# Patient Record
Sex: FEMALE | Race: WHITE | NOT HISPANIC OR LATINO | URBAN - NONMETROPOLITAN AREA
[De-identification: names, ages, dates, MRNs, and addresses within clinical notes are randomized per-mention and may not be internally consistent; named-entity substitution may affect disease eponyms.]

---

## 2017-01-23 ENCOUNTER — IMPORTED ENCOUNTER (OUTPATIENT)
Dept: URBAN - NONMETROPOLITAN AREA CLINIC 1 | Facility: CLINIC | Age: 82
End: 2017-01-23

## 2017-01-23 PROBLEM — H52.223: Noted: 2017-01-23

## 2017-01-23 PROBLEM — H52.03: Noted: 2017-01-23

## 2017-01-23 PROBLEM — H25.813: Noted: 2017-01-23

## 2017-01-23 PROBLEM — H52.4: Noted: 2017-01-23

## 2017-01-23 PROCEDURE — 92004 COMPRE OPH EXAM NEW PT 1/>: CPT

## 2017-01-23 NOTE — PATIENT DISCUSSION
Cataract(s)-Visually significant cataract OU.-Cataract(s) causing symptomatic impairment of visual function not correctable with a tolerable change in glasses or contact lenses lighting or non-operative means resulting in specific activity limitations and/or participation restrictions including but not limited to reading viewing television driving or meeting vocational or recreational needs. -Expectation is clearer vision and functional improvement in symptoms as well as reduced glare disability after cataract removal.-Order IOLMaster and OPD today. -Recommend Standard/Traditional based on today's OPD testing and lifestyle questionnaire.-All questions were answered regarding surgery including pre and post-op medications appointments activity restrictions and anesthetic usage.-The risks benefits and alternatives and special risk factors for the patient were discussed in detail including but not limited to: bleeding infection retinal detachment vitreous loss problems with the implant and possible need for additional surgery.-Although rare the possibility of complete vision loss was discussed.-The possible need for glasses post-operatively was discussed.-Order H&P based on history of high BP rapid heart beat COPD. -Patient elects to proceed with cataract surgery OS. Will schedule at patient's convenience and re-evaluate OD in the future.

## 2017-02-22 ENCOUNTER — IMPORTED ENCOUNTER (OUTPATIENT)
Dept: URBAN - NONMETROPOLITAN AREA CLINIC 1 | Facility: CLINIC | Age: 82
End: 2017-02-22

## 2017-02-22 PROBLEM — Z01.818: Noted: 2017-02-22

## 2017-02-22 PROBLEM — I10: Noted: 2017-02-22

## 2017-02-22 PROBLEM — J44.9: Noted: 2017-02-22

## 2017-02-22 PROBLEM — K21.9: Noted: 2017-02-22

## 2017-02-22 PROCEDURE — 99213 OFFICE O/P EST LOW 20 MIN: CPT

## 2022-02-08 ENCOUNTER — NEW PATIENT (OUTPATIENT)
Dept: URBAN - NONMETROPOLITAN AREA CLINIC 1 | Facility: CLINIC | Age: 87
End: 2022-02-08

## 2022-02-08 DIAGNOSIS — Z96.1: ICD-10-CM

## 2022-02-08 DIAGNOSIS — H52.4: ICD-10-CM

## 2022-02-08 PROCEDURE — 99204 OFFICE O/P NEW MOD 45 MIN: CPT

## 2022-02-08 PROCEDURE — 92015 DETERMINE REFRACTIVE STATE: CPT

## 2022-02-08 ASSESSMENT — TONOMETRY
OD_IOP_MMHG: 14
OS_IOP_MMHG: 14

## 2022-02-08 ASSESSMENT — VISUAL ACUITY
OD_CC: 20/25-2
OS_CC: 20/20-1

## 2022-02-08 NOTE — PATIENT DISCUSSION
Patient reports her current glasses do not fit her face correctly so she can see well. She has been wearing trifocal for 20+ years and has never had an issue until this last pair.

## 2022-02-12 ASSESSMENT — VISUAL ACUITY
OD_SC: 20/40
OS_AM: 20/30
OD_PAM: 20/25
OS_GLARE: 20/200
OD_GLARE: 20/100
OS_GLARE: 20/200
OS_SC: 20/40-
OD_GLARE: 20/100

## 2022-02-12 ASSESSMENT — TONOMETRY
OD_IOP_MMHG: 16
OS_IOP_MMHG: 16